# Patient Record
Sex: FEMALE | Race: WHITE | NOT HISPANIC OR LATINO | URBAN - METROPOLITAN AREA
[De-identification: names, ages, dates, MRNs, and addresses within clinical notes are randomized per-mention and may not be internally consistent; named-entity substitution may affect disease eponyms.]

---

## 2017-12-10 ENCOUNTER — EMERGENCY (EMERGENCY)
Facility: HOSPITAL | Age: 27
LOS: 0 days | Discharge: HOME | End: 2017-12-10
Attending: EMERGENCY MEDICINE
Payer: COMMERCIAL

## 2017-12-10 VITALS
HEART RATE: 97 BPM | TEMPERATURE: 98 F | OXYGEN SATURATION: 99 % | SYSTOLIC BLOOD PRESSURE: 115 MMHG | DIASTOLIC BLOOD PRESSURE: 77 MMHG | RESPIRATION RATE: 18 BRPM

## 2017-12-10 VITALS
DIASTOLIC BLOOD PRESSURE: 62 MMHG | HEIGHT: 56 IN | SYSTOLIC BLOOD PRESSURE: 126 MMHG | TEMPERATURE: 99 F | WEIGHT: 80.03 LBS | RESPIRATION RATE: 18 BRPM | OXYGEN SATURATION: 99 % | HEART RATE: 99 BPM

## 2017-12-10 DIAGNOSIS — N30.90 CYSTITIS, UNSPECIFIED WITHOUT HEMATURIA: ICD-10-CM

## 2017-12-10 DIAGNOSIS — G91.9 HYDROCEPHALUS, UNSPECIFIED: ICD-10-CM

## 2017-12-10 DIAGNOSIS — Z98.2 PRESENCE OF CEREBROSPINAL FLUID DRAINAGE DEVICE: ICD-10-CM

## 2017-12-10 DIAGNOSIS — N83.202 UNSPECIFIED OVARIAN CYST, LEFT SIDE: ICD-10-CM

## 2017-12-10 DIAGNOSIS — Z87.440 PERSONAL HISTORY OF URINARY (TRACT) INFECTIONS: ICD-10-CM

## 2017-12-10 DIAGNOSIS — R10.9 UNSPECIFIED ABDOMINAL PAIN: ICD-10-CM

## 2017-12-10 LAB
ADD ON TEST-SPECIMEN IN LAB: SIGNIFICANT CHANGE UP
ALBUMIN SERPL ELPH-MCNC: 3.9 G/DL — SIGNIFICANT CHANGE UP (ref 3.3–5)
ALP SERPL-CCNC: 46 U/L — SIGNIFICANT CHANGE UP (ref 40–120)
ALT FLD-CCNC: 15 U/L — SIGNIFICANT CHANGE UP (ref 12–78)
ANION GAP SERPL CALC-SCNC: 11 MMOL/L — SIGNIFICANT CHANGE UP (ref 5–17)
APPEARANCE UR: CLEAR — SIGNIFICANT CHANGE UP
APTT BLD: 29.1 SEC — SIGNIFICANT CHANGE UP (ref 27.5–37.4)
AST SERPL-CCNC: 9 U/L — LOW (ref 15–37)
BASOPHILS # BLD AUTO: 0 K/UL — SIGNIFICANT CHANGE UP (ref 0–0.2)
BASOPHILS NFR BLD AUTO: 1 % — SIGNIFICANT CHANGE UP (ref 0–2)
BILIRUB SERPL-MCNC: 0.6 MG/DL — SIGNIFICANT CHANGE UP (ref 0.2–1.2)
BILIRUB UR-MCNC: (no result)
BUN SERPL-MCNC: 17 MG/DL — SIGNIFICANT CHANGE UP (ref 7–23)
CALCIUM SERPL-MCNC: 9.2 MG/DL — SIGNIFICANT CHANGE UP (ref 8.5–10.1)
CHLORIDE SERPL-SCNC: 107 MMOL/L — SIGNIFICANT CHANGE UP (ref 96–108)
CO2 SERPL-SCNC: 21 MMOL/L — LOW (ref 22–31)
COLOR SPEC: (no result)
CREAT SERPL-MCNC: 0.44 MG/DL — LOW (ref 0.5–1.3)
DIFF PNL FLD: NEGATIVE — SIGNIFICANT CHANGE UP
EOSINOPHIL # BLD AUTO: 0.2 K/UL — SIGNIFICANT CHANGE UP (ref 0–0.5)
EOSINOPHIL NFR BLD AUTO: 2 % — SIGNIFICANT CHANGE UP (ref 0–6)
GLUCOSE SERPL-MCNC: 76 MG/DL — SIGNIFICANT CHANGE UP (ref 70–99)
GLUCOSE UR QL: NEGATIVE MG/DL — SIGNIFICANT CHANGE UP
HCT VFR BLD CALC: 39.8 % — SIGNIFICANT CHANGE UP (ref 34.5–45)
HGB BLD-MCNC: 13.4 G/DL — SIGNIFICANT CHANGE UP (ref 11.5–15.5)
INR BLD: 1.07 RATIO — SIGNIFICANT CHANGE UP (ref 0.88–1.16)
KETONES UR-MCNC: (no result)
LEUKOCYTE ESTERASE UR-ACNC: (no result)
LYMPHOCYTES # BLD AUTO: 2.3 K/UL — SIGNIFICANT CHANGE UP (ref 1–3.3)
LYMPHOCYTES # BLD AUTO: 37 % — SIGNIFICANT CHANGE UP (ref 13–44)
MCHC RBC-ENTMCNC: 32.9 PG — SIGNIFICANT CHANGE UP (ref 27–34)
MCHC RBC-ENTMCNC: 33.7 GM/DL — SIGNIFICANT CHANGE UP (ref 32–36)
MCV RBC AUTO: 97.6 FL — SIGNIFICANT CHANGE UP (ref 80–100)
MONOCYTES # BLD AUTO: 0.6 K/UL — SIGNIFICANT CHANGE UP (ref 0–0.9)
MONOCYTES NFR BLD AUTO: 11 % — SIGNIFICANT CHANGE UP (ref 2–14)
NEUTROPHILS # BLD AUTO: 2.4 K/UL — SIGNIFICANT CHANGE UP (ref 1.8–7.4)
NEUTROPHILS NFR BLD AUTO: 46 % — SIGNIFICANT CHANGE UP (ref 43–77)
NITRITE UR-MCNC: POSITIVE
PH UR: 6.5 — SIGNIFICANT CHANGE UP (ref 5–8)
PLATELET # BLD AUTO: 199 K/UL — SIGNIFICANT CHANGE UP (ref 150–400)
POTASSIUM SERPL-MCNC: 3.8 MMOL/L — SIGNIFICANT CHANGE UP (ref 3.5–5.3)
POTASSIUM SERPL-SCNC: 3.8 MMOL/L — SIGNIFICANT CHANGE UP (ref 3.5–5.3)
PROT SERPL-MCNC: 7.5 GM/DL — SIGNIFICANT CHANGE UP (ref 6–8.3)
PROT UR-MCNC: 15 MG/DL
PROTHROM AB SERPL-ACNC: 11.6 SEC — SIGNIFICANT CHANGE UP (ref 9.8–12.7)
RBC # BLD: 4.07 M/UL — SIGNIFICANT CHANGE UP (ref 3.8–5.2)
RBC # FLD: 10.7 % — SIGNIFICANT CHANGE UP (ref 10.3–14.5)
SODIUM SERPL-SCNC: 139 MMOL/L — SIGNIFICANT CHANGE UP (ref 135–145)
SP GR SPEC: 1.02 — SIGNIFICANT CHANGE UP (ref 1.01–1.02)
UROBILINOGEN FLD QL: 12 MG/DL
WBC # BLD: 5.5 K/UL — SIGNIFICANT CHANGE UP (ref 3.8–10.5)
WBC # FLD AUTO: 5.5 K/UL — SIGNIFICANT CHANGE UP (ref 3.8–10.5)

## 2017-12-10 PROCEDURE — 76830 TRANSVAGINAL US NON-OB: CPT | Mod: 26

## 2017-12-10 PROCEDURE — 99285 EMERGENCY DEPT VISIT HI MDM: CPT | Mod: 25

## 2017-12-10 RX ORDER — KETOROLAC TROMETHAMINE 30 MG/ML
15 SYRINGE (ML) INJECTION ONCE
Qty: 0 | Refills: 0 | Status: DISCONTINUED | OUTPATIENT
Start: 2017-12-10 | End: 2017-12-10

## 2017-12-10 RX ORDER — SODIUM CHLORIDE 9 MG/ML
1000 INJECTION INTRAMUSCULAR; INTRAVENOUS; SUBCUTANEOUS ONCE
Qty: 0 | Refills: 0 | Status: COMPLETED | OUTPATIENT
Start: 2017-12-10 | End: 2017-12-10

## 2017-12-10 RX ORDER — OXYCODONE HYDROCHLORIDE 5 MG/1
1 TABLET ORAL
Qty: 9 | Refills: 0 | OUTPATIENT
Start: 2017-12-10 | End: 2017-12-12

## 2017-12-10 RX ORDER — CEFTRIAXONE 500 MG/1
1 INJECTION, POWDER, FOR SOLUTION INTRAMUSCULAR; INTRAVENOUS ONCE
Qty: 0 | Refills: 0 | Status: DISCONTINUED | OUTPATIENT
Start: 2017-12-10 | End: 2017-12-10

## 2017-12-10 RX ORDER — OXYCODONE HYDROCHLORIDE 5 MG/1
5 TABLET ORAL ONCE
Qty: 0 | Refills: 0 | Status: DISCONTINUED | OUTPATIENT
Start: 2017-12-10 | End: 2017-12-10

## 2017-12-10 RX ORDER — CEPHALEXIN 500 MG
1 CAPSULE ORAL
Qty: 14 | Refills: 0 | OUTPATIENT
Start: 2017-12-10 | End: 2017-12-16

## 2017-12-10 RX ORDER — CEFTRIAXONE 500 MG/1
1000 INJECTION, POWDER, FOR SOLUTION INTRAMUSCULAR; INTRAVENOUS ONCE
Qty: 0 | Refills: 0 | Status: COMPLETED | OUTPATIENT
Start: 2017-12-10 | End: 2017-12-10

## 2017-12-10 RX ADMIN — Medication 15 MILLIGRAM(S): at 11:30

## 2017-12-10 RX ADMIN — Medication 15 MILLIGRAM(S): at 11:09

## 2017-12-10 RX ADMIN — CEFTRIAXONE 1000 MILLIGRAM(S): 500 INJECTION, POWDER, FOR SOLUTION INTRAMUSCULAR; INTRAVENOUS at 08:59

## 2017-12-10 RX ADMIN — SODIUM CHLORIDE 666.67 MILLILITER(S): 9 INJECTION INTRAMUSCULAR; INTRAVENOUS; SUBCUTANEOUS at 07:59

## 2017-12-10 RX ADMIN — SODIUM CHLORIDE 1000 MILLILITER(S): 9 INJECTION INTRAMUSCULAR; INTRAVENOUS; SUBCUTANEOUS at 08:59

## 2017-12-10 RX ADMIN — OXYCODONE HYDROCHLORIDE 5 MILLIGRAM(S): 5 TABLET ORAL at 12:31

## 2017-12-10 NOTE — ED PROVIDER NOTE - SKIN, MLM
Skin normal color for race, warm, dry and intact. No evidence of rash. Well healed lower lumbar/sacral scar.

## 2017-12-10 NOTE — ED PROVIDER NOTE - PROGRESS NOTE DETAILS
pelvic exam performed, chaperone annemarie eddy RN. normal external genitalia. bialt adnexal ttp R>L, no cmt. no discharge, no bleeding. - Jackson Rodrgiuez MD pt seen and cleared by gyn, who recommends analgesia, obgyn f/u. pt feels improved s/p oxycodone. discussed proper follow-up, indications to return to ED. stable for d/c. - Jackson Rodriguez MD

## 2017-12-10 NOTE — ED ADULT NURSE REASSESSMENT NOTE - NS ED NURSE REASSESS COMMENT FT1
received pt from [facility]. pt awaiting sono results. family at bedside. Will continue to monitor pt.

## 2017-12-10 NOTE — CONSULT NOTE ADULT - ASSESSMENT
A/P Pt with left 3-4 cm hemorrhagic cyst.  Pt with no clinical evidence of torsion.  Sono with flow to both ovaries.  Pt with possible UTI.    REC:    1) Abx for UTI  2) Analgesics for ov cyst discomfort  3) Pt to F/U with GYN in [place]  4) Pt to come back if any increased pain, bleeding, N/V  5) Pt cleared by GYN

## 2017-12-10 NOTE — ED PROVIDER NOTE - CARE PLAN
Principal Discharge DX:	Cyst of left ovary  Instructions for follow-up, activity and diet:	1. Take Tylenol as directed for pain.   2. Take oxycodone as directed for severe pain. This medication will make you drowsy, do not drive while taking medication.   3. Take Keflex as directed for urinary infection  4. Follow-up with your OBGYN or Dr. Campuzano in 2-3 days  5. Return immediately for any worsening or concerning symptoms  Secondary Diagnosis:	Cystitis

## 2017-12-10 NOTE — ED ADULT TRIAGE NOTE - CHIEF COMPLAINT QUOTE
LLQ x5 days worsening last night. Denies fever,chills, vomiting, nausea. (+) burning urination. Took aleve with no relief.

## 2017-12-10 NOTE — CONSULT NOTE ADULT - SUBJECTIVE AND OBJECTIVE BOX
26 y/o  LMP 11/23/17 with c/o of 5 days hx of ab pain not responsive to Alleve and Tylenol at home.  +Mild nausea        PMH - Spina Bifida  PSH -  shunt  POB - Not sig  Meds - None  SH - neg x 3    PE - VSS, afebrile    Ab - soft/nt/nd/no guarding/no rebound  Pelvic - Deferred  Back - No CVAT      Ultrasound of the female pelvis:    CLINICAL INFORMATION:   LEFT-sided pelvic  Pain.  shunt.    TECHNIQUE:  Transabdominal and endovaginal ultrasonography was performed.    FINDINGS:  CT scan dated 5/9/2016 is available for review.    The uterus is anteverted.  It measures 5.6 cm in length x 3.6 cm AP x   RIGHT 0.3 cm transverse.  Its contours are smooth.  The myometrium   demonstrates no focal lesion.  The endometrium demonstrates a thickness   of 1.2 cm.         The right ovary measures 2.8 x 1.7 x 1.2 cm.       The left ovary   measures 4.8 x 4.4 x 3.5 cm. there is a 3.6 x 3.8 x 3.5 cm complex   hemorrhagic cyst. Normal flow is noted bilaterally.      No adnexal   suspicious mass, calcification or fat is found.         Mild free fluid and the  shunt catheter is found in the pelvis.    The bladder appears unremarkable.      IMPRESSION:    3.6 x 3.8 x 3.5 cm complex hemorrhagic LEFT ovarian cyst.     Mild free fluid and the  shunt catheter is found in the pelvis.    CBC - WNL  UCG - neg  U/A - WBC 3-5; Many bacteria

## 2017-12-10 NOTE — ED PROVIDER NOTE - MEDICAL DECISION MAKING DETAILS
27F with hx spina bifida, self-catheterizes, also with hx chronic uti and ovarian cysts, presents with LLQ/suprapubic pain, cloudy urine. 27F with hx spina bifida, self-catheterizes, also with hx chronic uti and ovarian cysts, presents with LLQ/suprapubic pain, cloudy urine. +Bilat adnexal ttp. WIth minimal suprapubic ttp, no other abd ttp, very low suspicion of GI pathology including appendicitis, diverticulitis. Concern for potential cystitis, check UA, culture. CHeck labs including cbc, cmp. TVUS to assess for ovarian cyst or other gyn pathology. Pt not sexually active, no vag d/c, extremely low suspicion STI. Will treat for cystitis as necessary, give analgesia, and re-assess pt. - Jackson Rodriguez MD

## 2017-12-10 NOTE — ED PROVIDER NOTE - PLAN OF CARE
1. Take Tylenol as directed for pain.   2. Take oxycodone as directed for severe pain. This medication will make you drowsy, do not drive while taking medication.   3. Take Keflex as directed for urinary infection  4. Follow-up with your OBGYN or Dr. Campuzano in 2-3 days  5. Return immediately for any worsening or concerning symptoms

## 2017-12-10 NOTE — ED ADULT NURSE NOTE - OBJECTIVE STATEMENT
Pt presents to the ED with complaints of LLQ pain x 5 days which worsened last night. Pt denies vomiting but has had intermittent nausea, able to tolerate PO food and fluids but has decreased appetite. Pt states she has not had a BM in 4 days.

## 2017-12-10 NOTE — ED PROVIDER NOTE - OBJECTIVE STATEMENT
27F with pmh of spina bifida, s/p  shunt, ovarian cysts, chronic UTIs, self-catheterizes 4x a day, s/p R pelvic pain ~1 mo ago which she attributed to ruptured R ovarian cyst, presents with LLQ pain, x 5 days. Also with suprapubic pain. Pain does not radiate. Pain alleviated with heating pad. pain severe. "fluctuates from sharp to dull." no f/c. nausea, no vomiting. no diarrhea, has been having constipation, last BM ~4 days ago. +foul smell to urine, cloudy urine. did take Azo. 27F with pmh of spina bifida, s/p  shunt, ovarian cysts, chronic UTIs, self-catheterizes 4x a day, s/p R pelvic pain ~1 mo ago which she attributed to ruptured R ovarian cyst, presents with LLQ pain, x 5 days. Also with suprapubic pain. Pain does not radiate. Pain alleviated with heating pad. pain severe. "fluctuates from sharp to dull." no f/c. nausea, no vomiting. no diarrhea, has been having constipation, last BM ~4 days ago. +foul smell to urine, cloudy urine. did take Azo.    LMP ~11/23  PMD Rosalba thompson 27F with pmh of spina bifida, s/p  shunt, ovarian cysts, chronic UTIs, self-catheterizes 4x a day, s/p R pelvic pain ~1 mo ago which she attributed to ruptured R ovarian cyst, presents with LLQ pain, x 5 days. Also with suprapubic pain. Pain does not radiate. Pain alleviated with heating pad. pain severe. "fluctuates from sharp to dull." no f/c. nausea, no vomiting. no diarrhea, has been having constipation, last BM ~4 days ago. +foul smell to urine, cloudy urine. did take Azo. pt uses crutches to ambulate at baseline, no change in strength.    LMP ~11/23  PMD Rosalba thompson

## 2017-12-29 ENCOUNTER — EMERGENCY (EMERGENCY)
Facility: HOSPITAL | Age: 27
LOS: 0 days | Discharge: HOME | End: 2017-12-29
Attending: EMERGENCY MEDICINE | Admitting: EMERGENCY MEDICINE
Payer: COMMERCIAL

## 2017-12-29 VITALS
TEMPERATURE: 98 F | HEIGHT: 56 IN | DIASTOLIC BLOOD PRESSURE: 67 MMHG | HEART RATE: 94 BPM | RESPIRATION RATE: 18 BRPM | OXYGEN SATURATION: 98 % | SYSTOLIC BLOOD PRESSURE: 98 MMHG | WEIGHT: 82.89 LBS

## 2017-12-29 VITALS
HEART RATE: 88 BPM | SYSTOLIC BLOOD PRESSURE: 107 MMHG | OXYGEN SATURATION: 100 % | DIASTOLIC BLOOD PRESSURE: 70 MMHG | RESPIRATION RATE: 18 BRPM | TEMPERATURE: 98 F

## 2017-12-29 DIAGNOSIS — R10.32 LEFT LOWER QUADRANT PAIN: ICD-10-CM

## 2017-12-29 DIAGNOSIS — Q05.9 SPINA BIFIDA, UNSPECIFIED: ICD-10-CM

## 2017-12-29 DIAGNOSIS — N39.0 URINARY TRACT INFECTION, SITE NOT SPECIFIED: ICD-10-CM

## 2017-12-29 LAB
ALBUMIN SERPL ELPH-MCNC: 4.4 G/DL — SIGNIFICANT CHANGE UP (ref 3.3–5)
ALP SERPL-CCNC: 44 U/L — SIGNIFICANT CHANGE UP (ref 40–120)
ALT FLD-CCNC: 15 U/L — SIGNIFICANT CHANGE UP (ref 12–78)
ANION GAP SERPL CALC-SCNC: 10 MMOL/L — SIGNIFICANT CHANGE UP (ref 5–17)
APPEARANCE UR: (no result)
AST SERPL-CCNC: 8 U/L — LOW (ref 15–37)
BACTERIA # UR AUTO: (no result)
BASOPHILS # BLD AUTO: 0 K/UL — SIGNIFICANT CHANGE UP (ref 0–0.2)
BASOPHILS NFR BLD AUTO: 0.9 % — SIGNIFICANT CHANGE UP (ref 0–2)
BILIRUB SERPL-MCNC: 0.6 MG/DL — SIGNIFICANT CHANGE UP (ref 0.2–1.2)
BILIRUB UR-MCNC: NEGATIVE — SIGNIFICANT CHANGE UP
BUN SERPL-MCNC: 26 MG/DL — HIGH (ref 7–23)
CALCIUM SERPL-MCNC: 9.3 MG/DL — SIGNIFICANT CHANGE UP (ref 8.5–10.1)
CHLORIDE SERPL-SCNC: 111 MMOL/L — HIGH (ref 96–108)
CO2 SERPL-SCNC: 23 MMOL/L — SIGNIFICANT CHANGE UP (ref 22–31)
COLOR SPEC: YELLOW — SIGNIFICANT CHANGE UP
COMMENT - URINE: SIGNIFICANT CHANGE UP
CREAT SERPL-MCNC: 0.62 MG/DL — SIGNIFICANT CHANGE UP (ref 0.5–1.3)
DIFF PNL FLD: (no result)
EOSINOPHIL # BLD AUTO: 0.1 K/UL — SIGNIFICANT CHANGE UP (ref 0–0.5)
EOSINOPHIL NFR BLD AUTO: 3.4 % — SIGNIFICANT CHANGE UP (ref 0–6)
EPI CELLS # UR: SIGNIFICANT CHANGE UP
GLUCOSE SERPL-MCNC: 129 MG/DL — HIGH (ref 70–99)
GLUCOSE UR QL: NEGATIVE MG/DL — SIGNIFICANT CHANGE UP
HCG SERPL-ACNC: <1 MIU/ML — SIGNIFICANT CHANGE UP
HCT VFR BLD CALC: 39.9 % — SIGNIFICANT CHANGE UP (ref 34.5–45)
HGB BLD-MCNC: 13.1 G/DL — SIGNIFICANT CHANGE UP (ref 11.5–15.5)
KETONES UR-MCNC: (no result)
LEUKOCYTE ESTERASE UR-ACNC: (no result)
LYMPHOCYTES # BLD AUTO: 1.3 K/UL — SIGNIFICANT CHANGE UP (ref 1–3.3)
LYMPHOCYTES # BLD AUTO: 32.3 % — SIGNIFICANT CHANGE UP (ref 13–44)
MCHC RBC-ENTMCNC: 32.1 PG — SIGNIFICANT CHANGE UP (ref 27–34)
MCHC RBC-ENTMCNC: 33 GM/DL — SIGNIFICANT CHANGE UP (ref 32–36)
MCV RBC AUTO: 97.4 FL — SIGNIFICANT CHANGE UP (ref 80–100)
MONOCYTES # BLD AUTO: 0.4 K/UL — SIGNIFICANT CHANGE UP (ref 0–0.9)
MONOCYTES NFR BLD AUTO: 9.9 % — SIGNIFICANT CHANGE UP (ref 2–14)
NEUTROPHILS # BLD AUTO: 2.2 K/UL — SIGNIFICANT CHANGE UP (ref 1.8–7.4)
NEUTROPHILS NFR BLD AUTO: 53.5 % — SIGNIFICANT CHANGE UP (ref 43–77)
NITRITE UR-MCNC: POSITIVE
PH UR: 6 — SIGNIFICANT CHANGE UP (ref 5–8)
PLATELET # BLD AUTO: 258 K/UL — SIGNIFICANT CHANGE UP (ref 150–400)
POTASSIUM SERPL-MCNC: 3.2 MMOL/L — LOW (ref 3.5–5.3)
POTASSIUM SERPL-SCNC: 3.2 MMOL/L — LOW (ref 3.5–5.3)
PROT SERPL-MCNC: 7.8 GM/DL — SIGNIFICANT CHANGE UP (ref 6–8.3)
PROT UR-MCNC: 30 MG/DL
RBC # BLD: 4.09 M/UL — SIGNIFICANT CHANGE UP (ref 3.8–5.2)
RBC # FLD: 11 % — SIGNIFICANT CHANGE UP (ref 10.3–14.5)
RBC CASTS # UR COMP ASSIST: SIGNIFICANT CHANGE UP /HPF (ref 0–4)
SODIUM SERPL-SCNC: 144 MMOL/L — SIGNIFICANT CHANGE UP (ref 135–145)
SP GR SPEC: 1.02 — SIGNIFICANT CHANGE UP (ref 1.01–1.02)
UROBILINOGEN FLD QL: NEGATIVE MG/DL — SIGNIFICANT CHANGE UP
WBC # BLD: 4.1 K/UL — SIGNIFICANT CHANGE UP (ref 3.8–10.5)
WBC # FLD AUTO: 4.1 K/UL — SIGNIFICANT CHANGE UP (ref 3.8–10.5)
WBC UR QL: (no result)

## 2017-12-29 PROCEDURE — 99284 EMERGENCY DEPT VISIT MOD MDM: CPT

## 2017-12-29 PROCEDURE — 76830 TRANSVAGINAL US NON-OB: CPT | Mod: 26

## 2017-12-29 PROCEDURE — 74177 CT ABD & PELVIS W/CONTRAST: CPT | Mod: 26

## 2017-12-29 RX ORDER — MORPHINE SULFATE 50 MG/1
4 CAPSULE, EXTENDED RELEASE ORAL ONCE
Qty: 0 | Refills: 0 | Status: DISCONTINUED | OUTPATIENT
Start: 2017-12-29 | End: 2017-12-29

## 2017-12-29 RX ORDER — CEPHALEXIN 500 MG
1 CAPSULE ORAL
Qty: 14 | Refills: 0 | OUTPATIENT
Start: 2017-12-29 | End: 2018-01-04

## 2017-12-29 RX ORDER — OXYCODONE HYDROCHLORIDE 5 MG/1
5 TABLET ORAL ONCE
Qty: 0 | Refills: 0 | Status: DISCONTINUED | OUTPATIENT
Start: 2017-12-29 | End: 2017-12-29

## 2017-12-29 RX ORDER — KETOROLAC TROMETHAMINE 30 MG/ML
30 SYRINGE (ML) INJECTION ONCE
Qty: 0 | Refills: 0 | Status: DISCONTINUED | OUTPATIENT
Start: 2017-12-29 | End: 2017-12-29

## 2017-12-29 RX ORDER — SODIUM CHLORIDE 9 MG/ML
2000 INJECTION INTRAMUSCULAR; INTRAVENOUS; SUBCUTANEOUS ONCE
Qty: 0 | Refills: 0 | Status: COMPLETED | OUTPATIENT
Start: 2017-12-29 | End: 2017-12-29

## 2017-12-29 RX ORDER — POTASSIUM CHLORIDE 20 MEQ
40 PACKET (EA) ORAL ONCE
Qty: 0 | Refills: 0 | Status: COMPLETED | OUTPATIENT
Start: 2017-12-29 | End: 2017-12-29

## 2017-12-29 RX ORDER — DIPHENHYDRAMINE HCL 50 MG
25 CAPSULE ORAL ONCE
Qty: 0 | Refills: 0 | Status: COMPLETED | OUTPATIENT
Start: 2017-12-29 | End: 2017-12-29

## 2017-12-29 RX ORDER — METOCLOPRAMIDE HCL 10 MG
10 TABLET ORAL ONCE
Qty: 0 | Refills: 0 | Status: COMPLETED | OUTPATIENT
Start: 2017-12-29 | End: 2017-12-29

## 2017-12-29 RX ORDER — ONDANSETRON 8 MG/1
4 TABLET, FILM COATED ORAL ONCE
Qty: 0 | Refills: 0 | Status: COMPLETED | OUTPATIENT
Start: 2017-12-29 | End: 2017-12-29

## 2017-12-29 RX ADMIN — Medication 30 MILLIGRAM(S): at 04:13

## 2017-12-29 RX ADMIN — MORPHINE SULFATE 4 MILLIGRAM(S): 50 CAPSULE, EXTENDED RELEASE ORAL at 11:16

## 2017-12-29 RX ADMIN — Medication 10 MILLIGRAM(S): at 07:44

## 2017-12-29 RX ADMIN — Medication 30 MILLIGRAM(S): at 14:34

## 2017-12-29 RX ADMIN — OXYCODONE HYDROCHLORIDE 5 MILLIGRAM(S): 5 TABLET ORAL at 07:15

## 2017-12-29 RX ADMIN — MORPHINE SULFATE 4 MILLIGRAM(S): 50 CAPSULE, EXTENDED RELEASE ORAL at 04:13

## 2017-12-29 RX ADMIN — ONDANSETRON 4 MILLIGRAM(S): 8 TABLET, FILM COATED ORAL at 04:13

## 2017-12-29 RX ADMIN — Medication 40 MILLIEQUIVALENT(S): at 06:45

## 2017-12-29 RX ADMIN — MORPHINE SULFATE 4 MILLIGRAM(S): 50 CAPSULE, EXTENDED RELEASE ORAL at 10:46

## 2017-12-29 RX ADMIN — Medication 25 MILLIGRAM(S): at 07:44

## 2017-12-29 RX ADMIN — OXYCODONE HYDROCHLORIDE 5 MILLIGRAM(S): 5 TABLET ORAL at 06:45

## 2017-12-29 RX ADMIN — SODIUM CHLORIDE 2000 MILLILITER(S): 9 INJECTION INTRAMUSCULAR; INTRAVENOUS; SUBCUTANEOUS at 04:00

## 2017-12-29 NOTE — ED PROVIDER NOTE - OBJECTIVE STATEMENT
28 yo female with h/o spina bifida and  shunt c/o left sided abdominal pain.  Pt states left sided abdominal pain, seen Dec 10 in ED, diagnosed with hemorrhagic left ovarian cyst 28 yo female with h/o spina bifida and  shunt c/o left sided abdominal pain.  Pt states left sided abdominal pain, seen Dec 10 in ED, diagnosed with hemorrhagic left ovarian cyst.  Was given Rx oxycodone and was feeling better.  Followed up with her gyn and was given progesterone.  Symptoms resolved, she had a menstrual period last week.  Today the pain returned.  Had pain all day Thursday into the night.  No relief with aleve or tylenol.  Pain 10/10.  Feeling nauseated and vomited at home. 28 yo female with h/o spina bifida and  shunt c/o left sided abdominal pain.  Pt states left sided abdominal pain, seen Dec 10 in ED, diagnosed with hemorrhagic left ovarian cyst.  Was given Rx oxycodone and was feeling better.  Followed up with her gyn and was given progesterone.  Symptoms resolved, she had a menstrual period last week.  Today the pain returned.  Had pain all day Thursday into the night.  No relief with aleve or tylenol.  Pain 10/10.  Feeling nauseated and vomited at home.  Pt being worked up by Gyn for probable endometriosis

## 2017-12-29 NOTE — ED ADULT NURSE NOTE - OBJECTIVE STATEMENT
Pt presents to the ED with complaints of lower abdominal pain that began the morning of 12/28/17. Pt has a history of ovarian cysts and states she finished her period yesterday. Pt states the pain has been so severe it has caused nausea and vomiting. Pt states she has thrown up approx 4 times including once in the ED. Pt also states she has a h/o UTIs and does self cath due to medical history.

## 2017-12-29 NOTE — ED PROVIDER NOTE - MEDICAL DECISION MAKING DETAILS
26 yo female with abdominal pain she thinks is related to an ovarian cyst, will check labs, US and medicate

## 2017-12-29 NOTE — ED ADULT NURSE REASSESSMENT NOTE - NS ED NURSE REASSESS COMMENT FT1
Pt and family updated as to plan of care at this time. Pt taken for US and aware as to reason for US and has no questions. Awaiting pt return at this time. Will continue to monitor.

## 2017-12-29 NOTE — ED PROVIDER NOTE - PROGRESS NOTE DETAILS
US unremarkable. Pt with persistent severe left sided pain.  Will CT oral and IV contrast, although pt may just be suffering from endometriosis.

## 2017-12-31 LAB
-  AMIKACIN: SIGNIFICANT CHANGE UP
-  AMPICILLIN/SULBACTAM: SIGNIFICANT CHANGE UP
-  AMPICILLIN: SIGNIFICANT CHANGE UP
-  AZTREONAM: SIGNIFICANT CHANGE UP
-  CEFAZOLIN: SIGNIFICANT CHANGE UP
-  CEFEPIME: SIGNIFICANT CHANGE UP
-  CEFOXITIN: SIGNIFICANT CHANGE UP
-  CEFTAZIDIME: SIGNIFICANT CHANGE UP
-  CEFTRIAXONE: SIGNIFICANT CHANGE UP
-  CIPROFLOXACIN: SIGNIFICANT CHANGE UP
-  ERTAPENEM: SIGNIFICANT CHANGE UP
-  GENTAMICIN: SIGNIFICANT CHANGE UP
-  IMIPENEM: SIGNIFICANT CHANGE UP
-  LEVOFLOXACIN: SIGNIFICANT CHANGE UP
-  MEROPENEM: SIGNIFICANT CHANGE UP
-  NITROFURANTOIN: SIGNIFICANT CHANGE UP
-  PIPERACILLIN/TAZOBACTAM: SIGNIFICANT CHANGE UP
-  TOBRAMYCIN: SIGNIFICANT CHANGE UP
-  TRIMETHOPRIM/SULFAMETHOXAZOLE: SIGNIFICANT CHANGE UP
METHOD TYPE: SIGNIFICANT CHANGE UP

## 2018-01-01 LAB
-  AMIKACIN: SIGNIFICANT CHANGE UP
-  AMPICILLIN/SULBACTAM: SIGNIFICANT CHANGE UP
-  AMPICILLIN: SIGNIFICANT CHANGE UP
-  AZTREONAM: SIGNIFICANT CHANGE UP
-  CEFAZOLIN: SIGNIFICANT CHANGE UP
-  CEFEPIME: SIGNIFICANT CHANGE UP
-  CEFOXITIN: SIGNIFICANT CHANGE UP
-  CEFTAZIDIME: SIGNIFICANT CHANGE UP
-  CEFTRIAXONE: SIGNIFICANT CHANGE UP
-  CIPROFLOXACIN: SIGNIFICANT CHANGE UP
-  ERTAPENEM: SIGNIFICANT CHANGE UP
-  GENTAMICIN: SIGNIFICANT CHANGE UP
-  IMIPENEM: SIGNIFICANT CHANGE UP
-  LEVOFLOXACIN: SIGNIFICANT CHANGE UP
-  MEROPENEM: SIGNIFICANT CHANGE UP
-  NITROFURANTOIN: SIGNIFICANT CHANGE UP
-  PIPERACILLIN/TAZOBACTAM: SIGNIFICANT CHANGE UP
-  TOBRAMYCIN: SIGNIFICANT CHANGE UP
-  TRIMETHOPRIM/SULFAMETHOXAZOLE: SIGNIFICANT CHANGE UP
CULTURE RESULTS: SIGNIFICANT CHANGE UP
METHOD TYPE: SIGNIFICANT CHANGE UP
ORGANISM # SPEC MICROSCOPIC CNT: SIGNIFICANT CHANGE UP
SPECIMEN SOURCE: SIGNIFICANT CHANGE UP

## 2018-08-13 NOTE — ED ADULT NURSE NOTE - HIV INFORMATION PROVIDED
Returned call to pt.  Pt states affected area on leg has become more firm to touch, more painful and warmer.  Color is changing from red to more of a purple color.  Pt also states discoloration is spreading down the leg further toward the knee.  Pt denies alterations of sensation.    Advised pt that he should have area re-evaluated in ER d/t worsening.  Pt verbalizes understanding.   Yes

## 2019-02-14 ENCOUNTER — EMERGENCY (EMERGENCY)
Facility: HOSPITAL | Age: 29
LOS: 0 days | Discharge: ANOTHER HOSPITAL | End: 2019-02-14
Attending: EMERGENCY MEDICINE | Admitting: EMERGENCY MEDICINE
Payer: COMMERCIAL

## 2019-02-14 ENCOUNTER — INPATIENT (INPATIENT)
Facility: HOSPITAL | Age: 29
LOS: 2 days | Discharge: HOME | End: 2019-02-17
Attending: SPECIALIST | Admitting: SPECIALIST
Payer: COMMERCIAL

## 2019-02-14 VITALS
TEMPERATURE: 98 F | DIASTOLIC BLOOD PRESSURE: 79 MMHG | RESPIRATION RATE: 18 BRPM | HEART RATE: 97 BPM | SYSTOLIC BLOOD PRESSURE: 110 MMHG | OXYGEN SATURATION: 98 %

## 2019-02-14 VITALS
RESPIRATION RATE: 17 BRPM | HEART RATE: 107 BPM | SYSTOLIC BLOOD PRESSURE: 113 MMHG | TEMPERATURE: 99 F | DIASTOLIC BLOOD PRESSURE: 71 MMHG | OXYGEN SATURATION: 97 %

## 2019-02-14 VITALS
TEMPERATURE: 99 F | OXYGEN SATURATION: 98 % | HEIGHT: 56 IN | RESPIRATION RATE: 18 BRPM | HEART RATE: 121 BPM | DIASTOLIC BLOOD PRESSURE: 98 MMHG | SYSTOLIC BLOOD PRESSURE: 143 MMHG | WEIGHT: 82.89 LBS

## 2019-02-14 DIAGNOSIS — R11.0 NAUSEA: ICD-10-CM

## 2019-02-14 DIAGNOSIS — Q05.9 SPINA BIFIDA, UNSPECIFIED: ICD-10-CM

## 2019-02-14 DIAGNOSIS — K56.609 UNSPECIFIED INTESTINAL OBSTRUCTION, UNSPECIFIED AS TO PARTIAL VERSUS COMPLETE OBSTRUCTION: ICD-10-CM

## 2019-02-14 DIAGNOSIS — G91.9 HYDROCEPHALUS, UNSPECIFIED: ICD-10-CM

## 2019-02-14 DIAGNOSIS — Z79.899 OTHER LONG TERM (CURRENT) DRUG THERAPY: ICD-10-CM

## 2019-02-14 DIAGNOSIS — R10.9 UNSPECIFIED ABDOMINAL PAIN: ICD-10-CM

## 2019-02-14 DIAGNOSIS — K59.00 CONSTIPATION, UNSPECIFIED: ICD-10-CM

## 2019-02-14 LAB
ALBUMIN SERPL ELPH-MCNC: 4.4 G/DL — SIGNIFICANT CHANGE UP (ref 3.3–5)
ALP SERPL-CCNC: 58 U/L — SIGNIFICANT CHANGE UP (ref 40–120)
ALT FLD-CCNC: 39 U/L — SIGNIFICANT CHANGE UP (ref 12–78)
ANION GAP SERPL CALC-SCNC: 11 MMOL/L — SIGNIFICANT CHANGE UP (ref 5–17)
APPEARANCE UR: CLEAR — SIGNIFICANT CHANGE UP
APTT BLD: 30.1 SEC — SIGNIFICANT CHANGE UP (ref 27.5–36.3)
AST SERPL-CCNC: 26 U/L — SIGNIFICANT CHANGE UP (ref 15–37)
BASOPHILS # BLD AUTO: 0.05 K/UL — SIGNIFICANT CHANGE UP (ref 0–0.2)
BASOPHILS NFR BLD AUTO: 0.5 % — SIGNIFICANT CHANGE UP (ref 0–2)
BILIRUB SERPL-MCNC: 0.4 MG/DL — SIGNIFICANT CHANGE UP (ref 0.2–1.2)
BILIRUB UR-MCNC: NEGATIVE — SIGNIFICANT CHANGE UP
BLD GP AB SCN SERPL QL: SIGNIFICANT CHANGE UP
BUN SERPL-MCNC: 17 MG/DL — SIGNIFICANT CHANGE UP (ref 7–23)
CALCIUM SERPL-MCNC: 9.3 MG/DL — SIGNIFICANT CHANGE UP (ref 8.5–10.1)
CHLORIDE SERPL-SCNC: 106 MMOL/L — SIGNIFICANT CHANGE UP (ref 96–108)
CO2 SERPL-SCNC: 22 MMOL/L — SIGNIFICANT CHANGE UP (ref 22–31)
COLOR SPEC: YELLOW — SIGNIFICANT CHANGE UP
CREAT SERPL-MCNC: 0.58 MG/DL — SIGNIFICANT CHANGE UP (ref 0.5–1.3)
DIFF PNL FLD: NEGATIVE — SIGNIFICANT CHANGE UP
EOSINOPHIL # BLD AUTO: 0.07 K/UL — SIGNIFICANT CHANGE UP (ref 0–0.5)
EOSINOPHIL NFR BLD AUTO: 0.7 % — SIGNIFICANT CHANGE UP (ref 0–6)
GLUCOSE SERPL-MCNC: 116 MG/DL — HIGH (ref 70–99)
GLUCOSE UR QL: NEGATIVE MG/DL — SIGNIFICANT CHANGE UP
HCG SERPL-ACNC: <1 MIU/ML — SIGNIFICANT CHANGE UP
HCT VFR BLD CALC: 41.7 % — SIGNIFICANT CHANGE UP (ref 34.5–45)
HGB BLD-MCNC: 14 G/DL — SIGNIFICANT CHANGE UP (ref 11.5–15.5)
IMM GRANULOCYTES NFR BLD AUTO: 0.3 % — SIGNIFICANT CHANGE UP (ref 0–1.5)
INR BLD: 1 RATIO — SIGNIFICANT CHANGE UP (ref 0.88–1.16)
KETONES UR-MCNC: ABNORMAL
LACTATE SERPL-SCNC: 0.6 MMOL/L — SIGNIFICANT CHANGE UP (ref 0.5–2)
LEUKOCYTE ESTERASE UR-ACNC: NEGATIVE — SIGNIFICANT CHANGE UP
LIDOCAIN IGE QN: 296 U/L — SIGNIFICANT CHANGE UP (ref 73–393)
LYMPHOCYTES # BLD AUTO: 1.59 K/UL — SIGNIFICANT CHANGE UP (ref 1–3.3)
LYMPHOCYTES # BLD AUTO: 15.8 % — SIGNIFICANT CHANGE UP (ref 13–44)
MCHC RBC-ENTMCNC: 32 PG — SIGNIFICANT CHANGE UP (ref 27–34)
MCHC RBC-ENTMCNC: 33.6 GM/DL — SIGNIFICANT CHANGE UP (ref 32–36)
MCV RBC AUTO: 95.4 FL — SIGNIFICANT CHANGE UP (ref 80–100)
MONOCYTES # BLD AUTO: 0.66 K/UL — SIGNIFICANT CHANGE UP (ref 0–0.9)
MONOCYTES NFR BLD AUTO: 6.6 % — SIGNIFICANT CHANGE UP (ref 2–14)
NEUTROPHILS # BLD AUTO: 7.67 K/UL — HIGH (ref 1.8–7.4)
NEUTROPHILS NFR BLD AUTO: 76.1 % — SIGNIFICANT CHANGE UP (ref 43–77)
NITRITE UR-MCNC: NEGATIVE — SIGNIFICANT CHANGE UP
NRBC # BLD: 0 /100 WBCS — SIGNIFICANT CHANGE UP (ref 0–0)
PH UR: 9 — HIGH (ref 5–8)
PLATELET # BLD AUTO: 318 K/UL — SIGNIFICANT CHANGE UP (ref 150–400)
POTASSIUM SERPL-MCNC: 3.8 MMOL/L — SIGNIFICANT CHANGE UP (ref 3.5–5.3)
POTASSIUM SERPL-SCNC: 3.8 MMOL/L — SIGNIFICANT CHANGE UP (ref 3.5–5.3)
PROT SERPL-MCNC: 8.3 GM/DL — SIGNIFICANT CHANGE UP (ref 6–8.3)
PROT UR-MCNC: NEGATIVE MG/DL — SIGNIFICANT CHANGE UP
PROTHROM AB SERPL-ACNC: 11.1 SEC — SIGNIFICANT CHANGE UP (ref 10–12.9)
RBC # BLD: 4.37 M/UL — SIGNIFICANT CHANGE UP (ref 3.8–5.2)
RBC # FLD: 12.6 % — SIGNIFICANT CHANGE UP (ref 10.3–14.5)
SODIUM SERPL-SCNC: 139 MMOL/L — SIGNIFICANT CHANGE UP (ref 135–145)
SP GR SPEC: 1.01 — SIGNIFICANT CHANGE UP (ref 1.01–1.02)
TYPE + AB SCN PNL BLD: SIGNIFICANT CHANGE UP
UROBILINOGEN FLD QL: NEGATIVE MG/DL — SIGNIFICANT CHANGE UP
WBC # BLD: 10.07 K/UL — SIGNIFICANT CHANGE UP (ref 3.8–10.5)
WBC # FLD AUTO: 10.07 K/UL — SIGNIFICANT CHANGE UP (ref 3.8–10.5)

## 2019-02-14 PROCEDURE — 99285 EMERGENCY DEPT VISIT HI MDM: CPT | Mod: 25

## 2019-02-14 PROCEDURE — 74177 CT ABD & PELVIS W/CONTRAST: CPT | Mod: 26

## 2019-02-14 PROCEDURE — 71045 X-RAY EXAM CHEST 1 VIEW: CPT | Mod: 26

## 2019-02-14 RX ORDER — SODIUM CHLORIDE 9 MG/ML
1000 INJECTION INTRAMUSCULAR; INTRAVENOUS; SUBCUTANEOUS ONCE
Qty: 0 | Refills: 0 | Status: COMPLETED | OUTPATIENT
Start: 2019-02-14 | End: 2019-02-14

## 2019-02-14 RX ORDER — DIPHENHYDRAMINE HCL 50 MG
25 CAPSULE ORAL ONCE
Qty: 0 | Refills: 0 | Status: COMPLETED | OUTPATIENT
Start: 2019-02-14 | End: 2019-02-14

## 2019-02-14 RX ORDER — PROCHLORPERAZINE MALEATE 5 MG
10 TABLET ORAL ONCE
Qty: 0 | Refills: 0 | Status: COMPLETED | OUTPATIENT
Start: 2019-02-14 | End: 2019-02-14

## 2019-02-14 RX ORDER — PROCHLORPERAZINE MALEATE 5 MG
10 TABLET ORAL ONCE
Qty: 0 | Refills: 0 | Status: DISCONTINUED | OUTPATIENT
Start: 2019-02-14 | End: 2019-02-14

## 2019-02-14 RX ORDER — MORPHINE SULFATE 50 MG/1
2 CAPSULE, EXTENDED RELEASE ORAL ONCE
Qty: 0 | Refills: 0 | Status: DISCONTINUED | OUTPATIENT
Start: 2019-02-14 | End: 2019-02-14

## 2019-02-14 RX ORDER — MORPHINE SULFATE 50 MG/1
4 CAPSULE, EXTENDED RELEASE ORAL ONCE
Qty: 0 | Refills: 0 | Status: DISCONTINUED | OUTPATIENT
Start: 2019-02-14 | End: 2019-02-14

## 2019-02-14 RX ORDER — PROCHLORPERAZINE MALEATE 5 MG
2 TABLET ORAL ONCE
Qty: 0 | Refills: 0 | Status: DISCONTINUED | OUTPATIENT
Start: 2019-02-14 | End: 2019-02-14

## 2019-02-14 RX ORDER — SODIUM CHLORIDE 9 MG/ML
3 INJECTION INTRAMUSCULAR; INTRAVENOUS; SUBCUTANEOUS ONCE
Qty: 0 | Refills: 0 | Status: COMPLETED | OUTPATIENT
Start: 2019-02-14 | End: 2019-02-14

## 2019-02-14 RX ORDER — ONDANSETRON 8 MG/1
4 TABLET, FILM COATED ORAL ONCE
Qty: 0 | Refills: 0 | Status: COMPLETED | OUTPATIENT
Start: 2019-02-14 | End: 2019-02-14

## 2019-02-14 RX ORDER — ENOXAPARIN SODIUM 100 MG/ML
40 INJECTION SUBCUTANEOUS EVERY 24 HOURS
Qty: 0 | Refills: 0 | Status: DISCONTINUED | OUTPATIENT
Start: 2019-02-14 | End: 2019-02-17

## 2019-02-14 RX ORDER — SODIUM CHLORIDE 9 MG/ML
1000 INJECTION, SOLUTION INTRAVENOUS
Qty: 0 | Refills: 0 | Status: DISCONTINUED | OUTPATIENT
Start: 2019-02-14 | End: 2019-02-16

## 2019-02-14 RX ADMIN — SODIUM CHLORIDE 100 MILLILITER(S): 9 INJECTION, SOLUTION INTRAVENOUS at 17:32

## 2019-02-14 RX ADMIN — MORPHINE SULFATE 4 MILLIGRAM(S): 50 CAPSULE, EXTENDED RELEASE ORAL at 05:56

## 2019-02-14 RX ADMIN — SODIUM CHLORIDE 1000 MILLILITER(S): 9 INJECTION INTRAMUSCULAR; INTRAVENOUS; SUBCUTANEOUS at 09:56

## 2019-02-14 RX ADMIN — ONDANSETRON 4 MILLIGRAM(S): 8 TABLET, FILM COATED ORAL at 08:55

## 2019-02-14 RX ADMIN — MORPHINE SULFATE 4 MILLIGRAM(S): 50 CAPSULE, EXTENDED RELEASE ORAL at 07:06

## 2019-02-14 RX ADMIN — SODIUM CHLORIDE 1000 MILLILITER(S): 9 INJECTION INTRAMUSCULAR; INTRAVENOUS; SUBCUTANEOUS at 05:42

## 2019-02-14 RX ADMIN — Medication 10 MILLIGRAM(S): at 14:00

## 2019-02-14 RX ADMIN — MORPHINE SULFATE 4 MILLIGRAM(S): 50 CAPSULE, EXTENDED RELEASE ORAL at 10:32

## 2019-02-14 RX ADMIN — SODIUM CHLORIDE 3 MILLILITER(S): 9 INJECTION INTRAMUSCULAR; INTRAVENOUS; SUBCUTANEOUS at 05:56

## 2019-02-14 RX ADMIN — ONDANSETRON 4 MILLIGRAM(S): 8 TABLET, FILM COATED ORAL at 05:56

## 2019-02-14 RX ADMIN — SODIUM CHLORIDE 1000 MILLILITER(S): 9 INJECTION INTRAMUSCULAR; INTRAVENOUS; SUBCUTANEOUS at 13:54

## 2019-02-14 RX ADMIN — Medication 25 MILLIGRAM(S): at 07:15

## 2019-02-14 RX ADMIN — SODIUM CHLORIDE 1000 MILLILITER(S): 9 INJECTION INTRAMUSCULAR; INTRAVENOUS; SUBCUTANEOUS at 08:56

## 2019-02-14 RX ADMIN — MORPHINE SULFATE 2 MILLIGRAM(S): 50 CAPSULE, EXTENDED RELEASE ORAL at 13:59

## 2019-02-14 NOTE — H&P ADULT - ASSESSMENT
28F with PMH of spina bifida s/p  shunt placement (2 days old) w/ revision (5 years old) and spinal cord detethering presents with pain, nausea, and emesis that began last night.    - Admit to surgery, Dr. Encinas  - NG tube  - NPO, IV fluids  - No pain medication  - Will check lactate and x-ray to confirm NG tube placement  - Gottlieb  - Seen and examined with attending, Dr. Huang QUAN team surgery  Pager [phone]

## 2019-02-14 NOTE — ED ADULT NURSE NOTE - OBJECTIVE STATEMENT
pt is a 28 y.o. female c/o abdominal pain, nausea and constipation x1 day. pt was recently on vacation in [place] with family, came home on flight last night around 11pm. pt began to "feel strange" while on the plane and has not been able to eat/sleep/drink since. pt has history of spina bifida and SBO in the past. pt self-catheterizes for urine. pt is accompanied by mother. pain scale at rest is 8/10. vital signs as charted. will continue to monitor.

## 2019-02-14 NOTE — ED PROVIDER NOTE - OBJECTIVE STATEMENT
28 y.o female pmhx of spina bifida, hydrocephalus s/p  shunt, self caths, SBO 2 years ago transferred from [place] for new SBO. pt states around 7 am developed sudden onset of nausea, vomiting and abdominal pain that is identical to her last SBO. At [place] CT showed SBO with transition point in R lower quardant, NG tube placed. Pt states still feels nausea. pt transferred here and accepted by Dr. Encinas, Denies fevers, headache, dizziness, chest pain, sob, cough, urinary symptoms. 28 y.o female pmhx of spina bifida, hydrocephalus s/p  shunt, self caths, SBO 2 years ago transferred from [place] for new SBO. pt states around 7 am developed sudden onset of nausea, vomiting and abdominal pain that is identical to her last SBO. At [place] CT showed SBO with transition point in R lower quardant, NG tube placed. Pt states still feels nausea and abdominal pain. pt transferred here and accepted by Dr. Encinas, Denies fevers, headache, dizziness, chest pain, sob, cough, urinary symptoms.

## 2019-02-14 NOTE — PATIENT PROFILE ADULT - INFORMATION PROVIDED TO:
Patient: Alberto Guzman    Procedure Summary     Date:  08/29/18 Room / Location:  [facility] OR 06 /  [facility] OR    Anesthesia Start:  0813 Anesthesia Stop:  0840    Procedure:  CYSTOSCOPY TRANSURETHRAL RESECTION OF BLADDER TUMOR (N/A ) Diagnosis:       Bladder tumor      (Bladder tumor [D49.4])    Surgeon:  Alfonso Collins MD Provider:  Kenney Medina MD    Anesthesia Type:  general ASA Status:  3          Anesthesia Type: general  Last vitals  BP   (!) 89/44 (08/29/18 0846)   Temp   97 °F (36.1 °C) (08/29/18 0841)   Pulse   66 (08/29/18 0846)   Resp   12 (08/29/18 0846)     SpO2   100 % (08/29/18 0846)     Post Anesthesia Care and Evaluation    Patient location during evaluation: PHASE II  Patient participation: complete - patient participated  Level of consciousness: awake and alert  Pain score: 1  Pain management: adequate  Airway patency: patent  Anesthetic complications: No anesthetic complications  PONV Status: controlled  Cardiovascular status: acceptable  Respiratory status: acceptable  Hydration status: acceptable       patient

## 2019-02-14 NOTE — ED PROVIDER NOTE - CLINICAL SUMMARY MEDICAL DECISION MAKING FREE TEXT BOX
pt with nausea and abdominal pain ho sbo will get labs and ct pt with nausea and abdominal pain ho sbo will get labs and ct.    Pt request transfer to [facility] for SBO.

## 2019-02-14 NOTE — ED ADULT NURSE NOTE - OBJECTIVE STATEMENT
Pt received a&ox3, sent in as transfer from [place] for SBO confirmed by ct scan, pt c/o NV/generalized abd pain x 730 last night, pt still w mild pain/nv, medicated as per orders, pt arrived w nc in, hooked up to continuos suction as per PA order as per sx, draining small amounts of brown fluid, MD made aware of small amount of drainage, 20 gauge IV in left ac via [place], MD eval performed, will continue to monitor.

## 2019-02-14 NOTE — ED ADULT NURSE REASSESSMENT NOTE - NS ED NURSE REASSESS COMMENT FT1
Pt seen by Sx, NG tube interrogated and flushed by Sx, draining appropriately as per sx, on80 continuous suction, pt comfortable, endorses relief of pain/nausea, NAD.

## 2019-02-14 NOTE — ED PROVIDER NOTE - CLINICAL SUMMARY MEDICAL DECISION MAKING FREE TEXT BOX
28 y.o female pmhx of spina bifida, hydrocephalus s/p  shunt, self caths, SBO 2 years ago transferred from [place] for new [facility] [place]. NGT tube in place. Pain control, antiemetics and Surgery consult. 28 y.o female pmhx of spina bifida, hydrocephalus s/p  shunt, self caths, SBO 2 years ago transferred from [place] for [facility]. NGT tube in place. Will contact surgery. Jarett: 28 y.o female pmhx of spina bifida, hydrocephalus s/p  shunt, self caths, SBO 2 years ago transferred from [place] for [facility]. NGT tube in place. Will contact surgery.

## 2019-02-14 NOTE — ED ADULT NURSE NOTE - NSIMPLEMENTINTERV_GEN_ALL_ED
Implemented All Fall Risk Interventions:  [place] to call system. Call bell, personal items and telephone within reach. Instruct patient to call for assistance. Room bathroom lighting operational. Non-slip footwear when patient is off stretcher. Physically safe environment: no spills, clutter or unnecessary equipment. Stretcher in lowest position, wheels locked, appropriate side rails in place. Provide visual cue, wrist band, yellow gown, etc. Monitor gait and stability. Monitor for mental status changes and reorient to person, place, and time. Review medications for side effects contributing to fall risk. Reinforce activity limits and safety measures with patient and family.

## 2019-02-14 NOTE — ED PROVIDER NOTE - PROGRESS NOTE DETAILS
so am doctor with labs and ct B Nitin DO Attending Bryon, reviewed with pt and mother results of tests.  Pt request to be transferred to [facility] under care of surgeon who cared for her Dr. Naldo Manuel.  Transfer center called Attending Bryon, d/w Shae lin at transfer center and Dr. Manuel is no longer at [facility]. Pt and family want to go to [facility] and oncall surgeon [facility] called - Dr. Encinas.  D/w Dr. Encinas and agrees to accept.  Will plan ER to ER transfer Attending Slater, d/w pt and mother about transfer r/b/a d/w pt and wants transfer for continuity of care. Attending Bryon, d/w Shae lin at transfer center and Dr. Manuel is no longer at [facility]. Pt and family want to go to [facility] and oncall surgeon [facility] called - Dr. Encinas.  D/w Dr. Encinas and agrees to accept.  Will plan ER to ER transfer.  D/w Dr. Oden and will accept in ER

## 2019-02-14 NOTE — H&P ADULT - NSHPLABSRESULTS_GEN_ALL_CORE
T(C): 36.5 (02-14-19 @ 16:28), Max: 37.1 (02-14-19 @ 11:48)  HR: 113 (02-14-19 @ 16:28) (96 - 121)  BP: 106/76 (02-14-19 @ 16:28) (106/76 - 143/98)  RR: 18 (02-14-19 @ 16:28) (16 - 18)  SpO2: 100% (02-14-19 @ 16:28) (97% - 100%)    LABS:                        14.0   10.07 )-----------( 318      ( 14 Feb 2019 05:32 )             41.7     14 Feb 2019 05:32    139    |  106    |  17     ----------------------------<  116    3.8     |  22     |  0.58     Ca    9.3        14 Feb 2019 05:32    TPro  8.3    /  Alb  4.4    /  TBili  0.4    /  DBili  x      /  AST  26     /  ALT  39     /  AlkPhos  58     14 Feb 2019 05:32    PT/INR - ( 14 Feb 2019 05:32 )   PT: 11.1 sec;   INR: 1.00 ratio         PTT - ( 14 Feb 2019 05:32 )  PTT:30.1 sec      IMAGING    CT Abdomen and Pelvis w/ Oral Cont and w/ IV Cont (02.14.19 @ 08:42)    FINDINGS:    LOWER CHEST: Within normal limits.    LIVER: Within normal limits.  BILE DUCTS: Normal caliber.  GALLBLADDER: Within normal limits.  SPLEEN: Within normal limits.  PANCREAS: Within normal limits.  ADRENALS: Within normal limits.  KIDNEYS/URETERS: Within normal limits.    BLADDER: Within normal limits.  REPRODUCTIVE ORGANS: Uterus and adnexa are within normal limits.    BOWEL: There is dilatation of small bowel loops with transition point in   the right lower quadrant (2:61) consistent with small bowel obstruction.   Appendix is not visualized.  PERITONEUM: Small amount of free fluid in the pelvis. The distal portion   of the ventriculoperitoneal shunt is noted. No pneumoperitoneum.  VESSELS:  Within normal limits.  RETROPERITONEUM: No lymphadenopathy.    ABDOMINAL WALL: Within normal limits.  BONES: Scoliosis. Bilateral shallow acetabula. Chronic dislocation of the   bilateral hips.    IMPRESSION:     Small bowel obstruction with transition point in the right lower quadrant.    Small amount of free fluid in the pelvis. No pneumoperitoneum.

## 2019-02-14 NOTE — ED PROVIDER NOTE - NSRISKOFTRANSFER_ED_A_ED
Transportation Risk (There is always a risk of traffic delays resulting in deterioration of condition.)/Deterioration of Condition En Route/Increased Pain

## 2019-02-14 NOTE — ED CLERICAL - NS ED CLERK NOTE PRE-ARRIVAL INFORMATION; PCP CONTACT INFORMATION
Abd pain, vomiting. NGT placed.  SBO to be seen by Dr Encinas.  treated with MS, Zofran. VSS afebrile.  Hx spina bifida,  shunt

## 2019-02-14 NOTE — H&P ADULT - HISTORY OF PRESENT ILLNESS
28F with PMH of spina bifida s/p  shunt placement (2 days old) w/ revision (5 years old) and spinal cord detethering presents with pain, nausea, and emesis that began last night. She states the pain began at 8pm on 2/13 and she began vomiting at 10am on 2/14. She last passed flatus yesterday and had a small bowel movement today. The pain has been in her lower abdomen. Denies fevers or chills.    She was admitted in 2016 with a small bowel obstruction that resolved with nonoperative management. She is ambulatory. She is not able to void due to her spina bifida and requires self-catheterization. She takes no medications.    She presented to [facility] ED where she was afebrile, hemodynamically normal, WBC count 10, Cr 0.5, and CT showed SBO with transition point in the RLQ and small volume ascites. NG tube was placed at [place].

## 2019-02-14 NOTE — ED ADULT NURSE NOTE - NSIMPLEMENTINTERV_GEN_ALL_ED
Implemented All Universal Safety Interventions:  [place] to call system. Call bell, personal items and telephone within reach. Instruct patient to call for assistance. Room bathroom lighting operational. Non-slip footwear when patient is off stretcher. Physically safe environment: no spills, clutter or unnecessary equipment. Stretcher in lowest position, wheels locked, appropriate side rails in place.

## 2019-02-14 NOTE — ED PROVIDER NOTE - OBJECTIVE STATEMENT
27 yo female with spina bifida ambulatory with crutches self caths pw nausea and abdominal pain. pt has ho sbo and states this feels like when she was obstructed before

## 2019-02-14 NOTE — H&P ADULT - NSHPPHYSICALEXAM_GEN_ALL_CORE
General: Alert, NAD  Neuro: A+Ox3  HEENT: NC/AT, no asymmetry, no scleral icterus  Cardio: RRR  Resp: Airway patent, unlabored breathing  GI/Abd: Soft, mild tenderness in bilateral lower quadrants (had received morphine), nondistended, no masses, no peritoneal signs  Ext: Warm, no edema, full ROM

## 2019-02-15 LAB
ANION GAP SERPL CALC-SCNC: 13 MMO/L — SIGNIFICANT CHANGE UP (ref 7–14)
BUN SERPL-MCNC: 8 MG/DL — SIGNIFICANT CHANGE UP (ref 7–23)
CALCIUM SERPL-MCNC: 8.3 MG/DL — LOW (ref 8.4–10.5)
CHLORIDE SERPL-SCNC: 105 MMOL/L — SIGNIFICANT CHANGE UP (ref 98–107)
CO2 SERPL-SCNC: 23 MMOL/L — SIGNIFICANT CHANGE UP (ref 22–31)
CREAT SERPL-MCNC: 0.47 MG/DL — LOW (ref 0.5–1.3)
GLUCOSE SERPL-MCNC: 82 MG/DL — SIGNIFICANT CHANGE UP (ref 70–99)
HCT VFR BLD CALC: 33.2 % — LOW (ref 34.5–45)
HGB BLD-MCNC: 10.8 G/DL — LOW (ref 11.5–15.5)
MAGNESIUM SERPL-MCNC: 2.1 MG/DL — SIGNIFICANT CHANGE UP (ref 1.6–2.6)
MCHC RBC-ENTMCNC: 32 PG — SIGNIFICANT CHANGE UP (ref 27–34)
MCHC RBC-ENTMCNC: 32.5 % — SIGNIFICANT CHANGE UP (ref 32–36)
MCV RBC AUTO: 98.2 FL — SIGNIFICANT CHANGE UP (ref 80–100)
NRBC # FLD: 0 K/UL — LOW (ref 25–125)
PHOSPHATE SERPL-MCNC: 3.2 MG/DL — SIGNIFICANT CHANGE UP (ref 2.5–4.5)
PLATELET # BLD AUTO: 235 K/UL — SIGNIFICANT CHANGE UP (ref 150–400)
PMV BLD: 10.8 FL — SIGNIFICANT CHANGE UP (ref 7–13)
POTASSIUM SERPL-MCNC: 3.7 MMOL/L — SIGNIFICANT CHANGE UP (ref 3.5–5.3)
POTASSIUM SERPL-SCNC: 3.7 MMOL/L — SIGNIFICANT CHANGE UP (ref 3.5–5.3)
RBC # BLD: 3.38 M/UL — LOW (ref 3.8–5.2)
RBC # FLD: 12.9 % — SIGNIFICANT CHANGE UP (ref 10.3–14.5)
SODIUM SERPL-SCNC: 141 MMOL/L — SIGNIFICANT CHANGE UP (ref 135–145)
WBC # BLD: 9.76 K/UL — SIGNIFICANT CHANGE UP (ref 3.8–10.5)
WBC # FLD AUTO: 9.76 K/UL — SIGNIFICANT CHANGE UP (ref 3.8–10.5)

## 2019-02-15 RX ORDER — IBUPROFEN 200 MG
600 TABLET ORAL EVERY 6 HOURS
Qty: 0 | Refills: 0 | Status: DISCONTINUED | OUTPATIENT
Start: 2019-02-15 | End: 2019-02-15

## 2019-02-15 RX ORDER — ACETAMINOPHEN 500 MG
1000 TABLET ORAL ONCE
Qty: 0 | Refills: 0 | Status: COMPLETED | OUTPATIENT
Start: 2019-02-15 | End: 2019-02-15

## 2019-02-15 RX ORDER — DIPHENHYDRAMINE HCL 50 MG
25 CAPSULE ORAL ONCE
Qty: 0 | Refills: 0 | Status: COMPLETED | OUTPATIENT
Start: 2019-02-15 | End: 2019-02-15

## 2019-02-15 RX ORDER — INFLUENZA VIRUS VACCINE 15; 15; 15; 15 UG/.5ML; UG/.5ML; UG/.5ML; UG/.5ML
0.5 SUSPENSION INTRAMUSCULAR ONCE
Qty: 0 | Refills: 0 | Status: COMPLETED | OUTPATIENT
Start: 2019-02-15 | End: 2019-02-15

## 2019-02-15 RX ORDER — POTASSIUM CHLORIDE 20 MEQ
10 PACKET (EA) ORAL ONCE
Qty: 0 | Refills: 0 | Status: COMPLETED | OUTPATIENT
Start: 2019-02-15 | End: 2019-02-15

## 2019-02-15 RX ADMIN — SODIUM CHLORIDE 100 MILLILITER(S): 9 INJECTION, SOLUTION INTRAVENOUS at 08:55

## 2019-02-15 RX ADMIN — Medication 1000 MILLIGRAM(S): at 14:10

## 2019-02-15 RX ADMIN — Medication 1000 MILLIGRAM(S): at 05:30

## 2019-02-15 RX ADMIN — ENOXAPARIN SODIUM 40 MILLIGRAM(S): 100 INJECTION SUBCUTANEOUS at 04:53

## 2019-02-15 RX ADMIN — Medication 400 MILLIGRAM(S): at 13:40

## 2019-02-15 RX ADMIN — Medication 25 MILLIGRAM(S): at 21:24

## 2019-02-15 RX ADMIN — Medication 100 MILLIEQUIVALENT(S): at 10:55

## 2019-02-15 RX ADMIN — Medication 400 MILLIGRAM(S): at 04:53

## 2019-02-15 NOTE — PROVIDER CONTACT NOTE (OTHER) - ASSESSMENT
Patient A&Ox4. VSS. Patient complaining of headache, around eyes, stating that it feels like sinus headache.  Pain at is 8 out of 10

## 2019-02-15 NOTE — PROGRESS NOTE ADULT - SUBJECTIVE AND OBJECTIVE BOX
Surgery Progress Note    S:     Patient seen and examined. No acute events overnight. Feeling well this morning, no nausea/vomiting since the ER waiting room last night. No flatus or BM yet, will monitor.    O:    Vital Signs Last 24 Hrs  T(C): 36.7 (15 Feb 2019 04:50), Max: 37.2 (15 Feb 2019 02:00)  T(F): 98.1 (15 Feb 2019 04:50), Max: 98.9 (15 Feb 2019 02:00)  HR: 99 (15 Feb 2019 04:50) (95 - 117)  BP: 112/74 (15 Feb 2019 04:50) (103/65 - 124/83)  BP(mean): --  RR: 16 (15 Feb 2019 04:50) (16 - 18)  SpO2: 99% (15 Feb 2019 04:50) (97% - 100%)    Physical Exam:  Gen: NAD, NGT in place to LCWS  Resp: Unlabored breathing  Abd: soft, NTND, no rebound or guarding  Ext: WWP  Skin: No rashes    I&O's Detail    14 Feb 2019 07:01  -  15 Feb 2019 07:00  --------------------------------------------------------  IN:    lactated ringers.: 1200 mL  Total IN: 1200 mL    OUT:    Indwelling Catheter - Urethral: 1200 mL    Nasoenteral Tube: 300 mL  Total OUT: 1500 mL    Total NET: -300 mL          MEDICATIONS  (STANDING):  enoxaparin Injectable 40 milliGRAM(s) SubCutaneous every 24 hours  influenza   Vaccine 0.5 milliLiter(s) IntraMuscular once  lactated ringers. 1000 milliLiter(s) (100 mL/Hr) IV Continuous <Continuous>  potassium chloride  10 mEq/100 mL IVPB 10 milliEquivalent(s) IV Intermittent once    MEDICATIONS  (PRN):  ibuprofen  Tablet. 600 milliGRAM(s) Oral every 6 hours PRN Mild Pain (1 - 3)      Labs:                          10.8   9.76  )-----------( 235      ( 15 Feb 2019 05:23 )             33.2       02-15    141  |  105  |  8   ----------------------------<  82  3.7   |  23  |  0.47<L>    Ca    8.3<L>      15 Feb 2019 05:23  Phos  3.2     02-15  Mg     2.1     02-15    TPro  8.3  /  Alb  4.4  /  TBili  0.4  /  DBili  x   /  AST  26  /  ALT  39  /  AlkPhos  58  02-14      Radiology:  < from: CT Abdomen and Pelvis w/ Oral Cont and w/ IV Cont (02.14.19 @ 08:42) >  EXAM:  CT ABDOMEN AND PELVIS OC IC                            PROCEDURE DATE:  02/14/2019          INTERPRETATION:  CLINICAL INFORMATION: 28-year-old female with abdominal   pain and history of small bowel obstruction.         COMPARISON: 12/29/2017    PROCEDURE:   CT of the Abdomen and Pelvis was performed with intravenous contrast.   Intravenous contrast: 90 ml Omnipaque 350. 10 ml discarded.  Oral contrast: positive contrast was administered.  Sagittal and coronal reformats were performed.    FINDINGS:    LOWER CHEST: Within normal limits.    LIVER: Within normal limits.  BILE DUCTS: Normal caliber.  GALLBLADDER: Within normal limits.  SPLEEN: Within normal limits.  PANCREAS: Within normal limits.  ADRENALS: Within normal limits.  KIDNEYS/URETERS: Within normal limits.    BLADDER: Within normal limits.  REPRODUCTIVE ORGANS: Uterus and adnexa are within normal limits.    BOWEL: There is dilatation of small bowel loops with transition point in   the right lower quadrant (2:61) consistent with small bowel obstruction.   Appendix is not visualized.  PERITONEUM: Small amount of free fluid in the pelvis. The distal portion   of the ventriculoperitoneal shunt is noted. No pneumoperitoneum.  VESSELS:  Within normal limits.  RETROPERITONEUM: No lymphadenopathy.    ABDOMINAL WALL: Within normal limits.  BONES: Scoliosis. Bilateral shallow acetabula. Chronic dislocation of the   bilateral hips.    IMPRESSION:     Small bowel obstruction with transition point in the right lower quadrant.    Small amount of free fluid in the pelvis. No pneumoperitoneum.    < end of copied text >

## 2019-02-16 ENCOUNTER — TRANSCRIPTION ENCOUNTER (OUTPATIENT)
Age: 29
End: 2019-02-16

## 2019-02-16 LAB
ANION GAP SERPL CALC-SCNC: 19 MMO/L — HIGH (ref 7–14)
BUN SERPL-MCNC: 13 MG/DL — SIGNIFICANT CHANGE UP (ref 7–23)
CALCIUM SERPL-MCNC: 8.4 MG/DL — SIGNIFICANT CHANGE UP (ref 8.4–10.5)
CHLORIDE SERPL-SCNC: 102 MMOL/L — SIGNIFICANT CHANGE UP (ref 98–107)
CO2 SERPL-SCNC: 18 MMOL/L — LOW (ref 22–31)
CREAT SERPL-MCNC: 0.48 MG/DL — LOW (ref 0.5–1.3)
GLUCOSE SERPL-MCNC: 52 MG/DL — LOW (ref 70–99)
HCT VFR BLD CALC: 34 % — LOW (ref 34.5–45)
HGB BLD-MCNC: 10.6 G/DL — LOW (ref 11.5–15.5)
MAGNESIUM SERPL-MCNC: 2 MG/DL — SIGNIFICANT CHANGE UP (ref 1.6–2.6)
MCHC RBC-ENTMCNC: 31.2 % — LOW (ref 32–36)
MCHC RBC-ENTMCNC: 31.8 PG — SIGNIFICANT CHANGE UP (ref 27–34)
MCV RBC AUTO: 102.1 FL — HIGH (ref 80–100)
NRBC # FLD: 0 K/UL — LOW (ref 25–125)
PHOSPHATE SERPL-MCNC: 4.5 MG/DL — SIGNIFICANT CHANGE UP (ref 2.5–4.5)
PLATELET # BLD AUTO: 209 K/UL — SIGNIFICANT CHANGE UP (ref 150–400)
PMV BLD: 10.2 FL — SIGNIFICANT CHANGE UP (ref 7–13)
POTASSIUM SERPL-MCNC: 4.5 MMOL/L — SIGNIFICANT CHANGE UP (ref 3.5–5.3)
POTASSIUM SERPL-SCNC: 4.5 MMOL/L — SIGNIFICANT CHANGE UP (ref 3.5–5.3)
RBC # BLD: 3.33 M/UL — LOW (ref 3.8–5.2)
RBC # FLD: 12.4 % — SIGNIFICANT CHANGE UP (ref 10.3–14.5)
SODIUM SERPL-SCNC: 139 MMOL/L — SIGNIFICANT CHANGE UP (ref 135–145)
WBC # BLD: 6.9 K/UL — SIGNIFICANT CHANGE UP (ref 3.8–10.5)
WBC # FLD AUTO: 6.9 K/UL — SIGNIFICANT CHANGE UP (ref 3.8–10.5)

## 2019-02-16 RX ORDER — SODIUM CHLORIDE 9 MG/ML
1000 INJECTION, SOLUTION INTRAVENOUS
Qty: 0 | Refills: 0 | Status: DISCONTINUED | OUTPATIENT
Start: 2019-02-16 | End: 2019-02-16

## 2019-02-16 RX ORDER — ACETAMINOPHEN 500 MG
650 TABLET ORAL ONCE
Qty: 0 | Refills: 0 | Status: COMPLETED | OUTPATIENT
Start: 2019-02-16 | End: 2019-02-16

## 2019-02-16 RX ORDER — ACETAMINOPHEN 500 MG
1000 TABLET ORAL ONCE
Qty: 0 | Refills: 0 | Status: COMPLETED | OUTPATIENT
Start: 2019-02-16 | End: 2019-02-16

## 2019-02-16 RX ORDER — DIPHENHYDRAMINE HCL 50 MG
25 CAPSULE ORAL ONCE
Qty: 0 | Refills: 0 | Status: COMPLETED | OUTPATIENT
Start: 2019-02-16 | End: 2019-02-16

## 2019-02-16 RX ADMIN — Medication 25 MILLIGRAM(S): at 01:51

## 2019-02-16 RX ADMIN — Medication 1000 MILLIGRAM(S): at 01:10

## 2019-02-16 RX ADMIN — Medication 400 MILLIGRAM(S): at 00:47

## 2019-02-16 RX ADMIN — SODIUM CHLORIDE 100 MILLILITER(S): 9 INJECTION, SOLUTION INTRAVENOUS at 09:30

## 2019-02-16 RX ADMIN — Medication 650 MILLIGRAM(S): at 13:04

## 2019-02-16 RX ADMIN — Medication 650 MILLIGRAM(S): at 12:12

## 2019-02-16 RX ADMIN — Medication 25 MILLIGRAM(S): at 22:04

## 2019-02-16 RX ADMIN — ENOXAPARIN SODIUM 40 MILLIGRAM(S): 100 INJECTION SUBCUTANEOUS at 05:57

## 2019-02-16 NOTE — PROVIDER CONTACT NOTE (OTHER) - ASSESSMENT
Pt A&O4. Pt denies N/V, pt states she has acid reflux. Pt has been passing gas overnight. Pt asking if suction is needed again.

## 2019-02-16 NOTE — PROGRESS NOTE ADULT - ASSESSMENT
28F with PMH of spina bifida s/p  shunt placement (2 days old) w/ revision (5 years old) and spinal cord detethering admitted for SBO, NGT in place.    - Passed NGT clamp trial, will remove this am  - will advance to CLD today  - Avoid narcotics  - Continue rendon for 24 more hours for patient preference (straight caths at home)  - Monitor GI function and po tolerance    B team surgery  Pager [phone]

## 2019-02-16 NOTE — PROGRESS NOTE ADULT - SUBJECTIVE AND OBJECTIVE BOX
Surgery Progress Note    S:     Patient seen and examined. NGT clamped overnight, patient tolerated well without nausea or vomiting. Returned to suction this am, 200-300 clear fluid. Plan to remove NGT and start CLD this morning.    O:    Vital Signs Last 24 Hrs  T(C): 36.9 (16 Feb 2019 05:57), Max: 37.7 (15 Feb 2019 17:00)  T(F): 98.5 (16 Feb 2019 05:57), Max: 99.8 (15 Feb 2019 17:00)  HR: 81 (16 Feb 2019 05:57) (81 - 113)  BP: 104/69 (16 Feb 2019 05:57) (104/69 - 114/73)  BP(mean): --  RR: 18 (16 Feb 2019 05:57) (16 - 18)  SpO2: 99% (16 Feb 2019 05:57) (98% - 100%)    Physical Exam:  Gen: NAD  Resp: Unlabored breathing  Abd: soft, NTND, no rebound or guarding  Ext: WWP  Skin: No rashes    I&O's Detail    15 Feb 2019 07:01  -  16 Feb 2019 07:00  --------------------------------------------------------  IN:  Total IN: 0 mL    OUT:    Indwelling Catheter - Urethral: 3150 mL    Nasoenteral Tube: 50 mL  Total OUT: 3200 mL    Total NET: -3200 mL          MEDICATIONS  (STANDING):  dextrose 5% + sodium chloride 0.45% 1000 milliLiter(s) (100 mL/Hr) IV Continuous <Continuous>  enoxaparin Injectable 40 milliGRAM(s) SubCutaneous every 24 hours  influenza   Vaccine 0.5 milliLiter(s) IntraMuscular once    MEDICATIONS  (PRN):      Labs:                          10.6   6.90  )-----------( 209      ( 16 Feb 2019 06:00 )             34.0       02-16    139  |  102  |  13  ----------------------------<  52<L>  4.5   |  18<L>  |  0.48<L>    Ca    8.4      16 Feb 2019 06:00  Phos  4.5     02-16  Mg     2.0     02-16        Radiology:  no new imaging

## 2019-02-16 NOTE — PROVIDER CONTACT NOTE (OTHER) - BACKGROUND
Pt admitted for SBO. Pt has a PMH of spina bifida and PSH of  shunt placement. Suction to NG tube was d/c, NG tube kept clamped overnight, pt monitored for N/V.

## 2019-02-17 VITALS
OXYGEN SATURATION: 99 % | SYSTOLIC BLOOD PRESSURE: 103 MMHG | RESPIRATION RATE: 18 BRPM | DIASTOLIC BLOOD PRESSURE: 66 MMHG | TEMPERATURE: 99 F | HEART RATE: 92 BPM

## 2019-02-17 LAB
ANION GAP SERPL CALC-SCNC: 12 MMO/L — SIGNIFICANT CHANGE UP (ref 7–14)
APPEARANCE UR: SIGNIFICANT CHANGE UP
BACTERIA # UR AUTO: SIGNIFICANT CHANGE UP
BILIRUB UR-MCNC: NEGATIVE — SIGNIFICANT CHANGE UP
BLOOD UR QL VISUAL: NEGATIVE — SIGNIFICANT CHANGE UP
BUN SERPL-MCNC: 13 MG/DL — SIGNIFICANT CHANGE UP (ref 7–23)
CALCIUM SERPL-MCNC: 8.9 MG/DL — SIGNIFICANT CHANGE UP (ref 8.4–10.5)
CHLORIDE SERPL-SCNC: 101 MMOL/L — SIGNIFICANT CHANGE UP (ref 98–107)
CO2 SERPL-SCNC: 26 MMOL/L — SIGNIFICANT CHANGE UP (ref 22–31)
COLOR SPEC: SIGNIFICANT CHANGE UP
CREAT SERPL-MCNC: 0.55 MG/DL — SIGNIFICANT CHANGE UP (ref 0.5–1.3)
GLUCOSE SERPL-MCNC: 80 MG/DL — SIGNIFICANT CHANGE UP (ref 70–99)
GLUCOSE UR-MCNC: NEGATIVE — SIGNIFICANT CHANGE UP
HCT VFR BLD CALC: 36.7 % — SIGNIFICANT CHANGE UP (ref 34.5–45)
HGB BLD-MCNC: 11.7 G/DL — SIGNIFICANT CHANGE UP (ref 11.5–15.5)
HYALINE CASTS # UR AUTO: SIGNIFICANT CHANGE UP
KETONES UR-MCNC: NEGATIVE — SIGNIFICANT CHANGE UP
LEUKOCYTE ESTERASE UR-ACNC: SIGNIFICANT CHANGE UP
MAGNESIUM SERPL-MCNC: 2 MG/DL — SIGNIFICANT CHANGE UP (ref 1.6–2.6)
MCHC RBC-ENTMCNC: 31.5 PG — SIGNIFICANT CHANGE UP (ref 27–34)
MCHC RBC-ENTMCNC: 31.9 % — LOW (ref 32–36)
MCV RBC AUTO: 98.9 FL — SIGNIFICANT CHANGE UP (ref 80–100)
NITRITE UR-MCNC: NEGATIVE — SIGNIFICANT CHANGE UP
NRBC # FLD: 0 K/UL — LOW (ref 25–125)
PH UR: 7 — SIGNIFICANT CHANGE UP (ref 5–8)
PHOSPHATE SERPL-MCNC: 3.5 MG/DL — SIGNIFICANT CHANGE UP (ref 2.5–4.5)
PLATELET # BLD AUTO: 240 K/UL — SIGNIFICANT CHANGE UP (ref 150–400)
PMV BLD: 10.2 FL — SIGNIFICANT CHANGE UP (ref 7–13)
POTASSIUM SERPL-MCNC: 3.7 MMOL/L — SIGNIFICANT CHANGE UP (ref 3.5–5.3)
POTASSIUM SERPL-SCNC: 3.7 MMOL/L — SIGNIFICANT CHANGE UP (ref 3.5–5.3)
PROT UR-MCNC: 10 — SIGNIFICANT CHANGE UP
RBC # BLD: 3.71 M/UL — LOW (ref 3.8–5.2)
RBC # FLD: 12.5 % — SIGNIFICANT CHANGE UP (ref 10.3–14.5)
RBC CASTS # UR COMP ASSIST: HIGH (ref 0–?)
SODIUM SERPL-SCNC: 139 MMOL/L — SIGNIFICANT CHANGE UP (ref 135–145)
SP GR SPEC: 1.01 — SIGNIFICANT CHANGE UP (ref 1–1.04)
SQUAMOUS # UR AUTO: SIGNIFICANT CHANGE UP
UROBILINOGEN FLD QL: NORMAL — SIGNIFICANT CHANGE UP
WBC # BLD: 6.55 K/UL — SIGNIFICANT CHANGE UP (ref 3.8–10.5)
WBC # FLD AUTO: 6.55 K/UL — SIGNIFICANT CHANGE UP (ref 3.8–10.5)
WBC UR QL: HIGH (ref 0–?)

## 2019-02-17 RX ORDER — PSYLLIUM SEED (WITH DEXTROSE)
1 POWDER (GRAM) ORAL ONCE
Qty: 0 | Refills: 0 | Status: COMPLETED | OUTPATIENT
Start: 2019-02-17 | End: 2019-02-17

## 2019-02-17 RX ADMIN — Medication 1 PACKET(S): at 11:54

## 2019-02-17 RX ADMIN — ENOXAPARIN SODIUM 40 MILLIGRAM(S): 100 INJECTION SUBCUTANEOUS at 05:44

## 2019-02-17 RX ADMIN — Medication 30 MILLILITER(S): at 07:55

## 2019-02-17 NOTE — DISCHARGE NOTE ADULT - HOSPITAL COURSE
28F with PMH of spina bifida s/p  shunt placement (2 days old) w/ revision (5 years old) and spinal cord detethering admitted with SBO. She was treated conservatively with NPO, NGT, and IVF. She began showing signs of bowl function on HD2. The NGT was clamped and then removed on HD3. Her diet was advanced as tolerated. By HD4, she was advanced to regular diet and tolerating regular food without abdominal pain and continuing to have bowel function. During the course of her hospitalization, she had a rendon catheter in place for her comfort as IVF hydration would have require frequent self-catheterizations. The rendon catheter was removed on HD4 and she was able to self-catheterize as she normally does at home without any issues. At the time of discharge, she was given strict return precautions to return to the ED should she develop recurrent symptoms of bowel obstruction (nausea and perfuse vomiting, abdominal pain, lack of passing flatus or stool). She was instructed to follow-up with Dr. Encinas as an outpatient.

## 2019-02-17 NOTE — DISCHARGE NOTE ADULT - PATIENT PORTAL LINK FT
You can access the [facility] Patient Portal, offered by [facility], by registering with the following website: [email or web address]

## 2019-02-17 NOTE — DISCHARGE NOTE ADULT - CARE PLAN
Principal Discharge DX:	SBO (small bowel obstruction)  Goal:	Resolution of bowel obstruction, diet tolerance, pain control  Assessment and plan of treatment:	You were found to have a small bowel obstruction. Your obstruction resolved with conservative management.  DIET: Return to your usual diet.  RETURN PRECAUTIONS: hould she develop recurrent symptoms of bowel obstruction (nausea and perfuse vomiting, abdominal pain, lack of passing flatus or stool).   FOLLOW-UP: Please follow up with your primary care physician in one week regarding your hospitalization. Please also follow-up with Dr. Encinas as an outpatient in 2-3 weeks from discharge.

## 2019-02-17 NOTE — DISCHARGE NOTE ADULT - PLAN OF CARE
Resolution of bowel obstruction, diet tolerance, pain control You were found to have a small bowel obstruction. Your obstruction resolved with conservative management.  DIET: Return to your usual diet.  RETURN PRECAUTIONS: hould she develop recurrent symptoms of bowel obstruction (nausea and perfuse vomiting, abdominal pain, lack of passing flatus or stool).   FOLLOW-UP: Please follow up with your primary care physician in one week regarding your hospitalization. Please also follow-up with Dr. Encinas as an outpatient in 2-3 weeks from discharge.

## 2019-02-17 NOTE — DISCHARGE NOTE ADULT - MEDICATION SUMMARY - MEDICATIONS TO TAKE
I will START or STAY ON the medications listed below when I get home from the hospital:    oxyCODONE 5 mg oral tablet  -- 1 tab(s) by mouth every 8 hours MDD:3  -- Caution federal law prohibits the transfer of this drug to any person other  than the person for whom it was prescribed.  It is very important that you take or use this exactly as directed.  Do not skip doses or discontinue unless directed by your doctor.  May cause drowsiness.  Alcohol may intensify this effect.  Use care when operating dangerous machinery.  This prescription cannot be refilled.  Using more of this medication than prescribed may cause serious breathing problems.    -- Indication: For pain - home med    Vicodin 5 mg-300 mg oral tablet  -- 1 tab(s) by mouth every 6 hours MDD:4  -- Caution federal law prohibits the transfer of this drug to any person other  than the person for whom it was prescribed.  Do not drink alcoholic beverages when taking this medication.  May cause drowsiness.  Alcohol may intensify this effect.  Use care when operating dangerous machinery.  This drug may impair the ability to drive or operate machinery.  Use care until you become familiar with its effects.  This product contains acetaminophen.  Do not use  with any other product containing acetaminophen to prevent possible liver damage.  Using more of this medication than prescribed may cause serious breathing problems.    -- Indication: For pain - home med    Zofran ODT 8 mg oral tablet, disintegrating  -- 1 tab(s) by mouth 2 times a day, As Needed - for nausea  -- Indication: For nausea - home med    cephalexin 500 mg oral tablet  -- 1 tab(s) by mouth 2 times a day   -- Finish all this medication unless otherwise directed by prescriber.    -- Indication: For Infection prevention/treatment - home med    Keflex 250 mg oral capsule  -- 1 cap(s) by mouth 2 times a day   -- Finish all this medication unless otherwise directed by prescriber.    -- Indication: For Infection prevention/treatment - home med    docusate sodium 100 mg oral capsule  -- 1 cap(s) by mouth 3 times a day  -- Indication: For Constipation - home med    polyethylene glycol 3350 oral powder for reconstitution  -- 17 gram(s) by mouth 2 times a day  -- Indication: For constipation - home med

## 2019-02-17 NOTE — PROGRESS NOTE ADULT - SUBJECTIVE AND OBJECTIVE BOX
B Team Surgery Progress Note    Interval: NGT removed yesterday. Patient now tolerating CLD. She did inadvertently have approximately half a plate of regular food during dinner last night due to misunderstanding between the patient/mother and the primary team. Had complaints of burning in her bladder while walking with the rendon catheter. No acute overnight events.    SUBJECTIVE: Patient seen and examined at the bedside. Feeling well this morning. Tolerating clear liquids without nausea or vomiting. Pain is well controlled. Has passed flatus, has not passed a bowel movement. Ambulating at baseline function with crutches/assistance.    VITALS  T(C): 37.1 (02-16-19 @ 16:58), Max: 37.1 (02-16-19 @ 16:58)  HR: 98 (02-16-19 @ 22:01) (78 - 98)  BP: 99/59 (02-16-19 @ 22:01) (95/60 - 104/69)  RR: 18 (02-16-19 @ 22:01) (18 - 20)  SpO2: 97% (02-16-19 @ 22:01) (97% - 100%)    Is/Os    02-15 @ 07:01  -  02-16 @ 07:00  --------------------------------------------------------  IN:  Total IN: 0 mL    OUT:    Indwelling Catheter - Urethral: 3150 mL    Nasoenteral Tube: 50 mL  Total OUT: 3200 mL    Total NET: -3200 mL      02-16 @ 07:01  -  02-17 @ 01:21  --------------------------------------------------------  IN:    Oral Fluid: 630 mL  Total IN: 630 mL    OUT:    Indwelling Catheter - Urethral: 3550 mL    Nasoenteral Tube: 100 mL  Total OUT: 3650 mL    Total NET: -3020 mL    PHYSICAL EXAM:   General: NAD, Lying in bed comfortably, alert, oriented x3  Pulm: Non-labored breathing on RA  GI/Abd: Soft, NT/ND, no rebound/guarding     MEDICATIONS (STANDING): enoxaparin Injectable 40 milliGRAM(s) SubCutaneous every 24 hours  influenza   Vaccine 0.5 milliLiter(s) IntraMuscular once    MEDICATIONS (PRN):  LABS  CBC (02-16 @ 06:00)                              10.6<L>                         6.90    )----------------(  209        --    % Neutrophils, --    % Lymphocytes, ANC: --                                  34.0<L>    BMP (02-16 @ 06:00)             139     |  102     |  13    		Ca++ --      Ca 8.4                ---------------------------------( 52<L> 		Mg 2.0                4.5     |  18<L>   |  0.48<L>			Ph 4.5

## 2019-02-17 NOTE — PROGRESS NOTE ADULT - ASSESSMENT
ASSESSMENT   28F with PMH of spina bifida s/p  shunt placement (2 days old) w/ revision (5 years old) and spinal cord detethering admitted for SBO, now resolved and tolerating CLD    PLAN  - Advance to regular diet this AM  - d/c rendon this AM and f/u urinary symptoms  - Monitor GI function and po tolerance  - DVT ppx  - dispo planning for later today    B team surgery  Pager [phone]

## 2019-04-09 NOTE — ED PROVIDER NOTE - MEDICATIONS Q1 - PARENTERAL NARCOTICS ADMINISTERED FOR MANAGEMENT OF PAIN
Yes

## 2019-07-26 NOTE — DISCHARGE NOTE ADULT - CARE PROVIDER_API CALL
----- Message from Davonte Betancourt DO sent at 7/26/2019 10:23 AM CDT -----  Labs reviewed, he appears to have a urinary tract infection, if he has any urinary symptoms whatsoever, he needs Bactrim DS, 1 tab p.o. b.i.d. ×7 days. Also please add a direct bilirubin for hyperbilirubinemia, we'll discuss the remainder of his labs in the office.  
Bilirubin added.  Pt was notified of results.  He states that he has no symptoms but will call if he develops any.   
Reece Encinas)  Surgery  [address], [address]  [place] [address]  Phone: [phone]  Fax: [phone]  Follow Up Time:

## 2019-08-28 NOTE — ED PROVIDER NOTE - CROS ED ROS STATEMENT
Word finding difficulties, resolved  Lack of fluency and repetition resolved  Intact comprehension  Associated with transient right temporal visual field blurring which is no longer present  CT cerebral perfusion analysis showed a T-max of more than 6 seconds likely representing combination of complete infarct and ischemia.  Awaiting neurology opinion after results of MRI scan of the head with and without contrast-which was negative, with no evidence of acute infarction in the brain parenchyma. NIH score on presentation was 2       all other ROS negative except as per HPI

## 2020-01-29 NOTE — ED PROVIDER NOTE - CPE EDP HEME LYMPH NORM
[facility]  [address]  [phone] [address] of Service: nursing home place of service: [facility] Coverage      NAME: Maine Gonzalez  AGE: 76 y o  SEX: female [phone]    DATE OF ENCOUNTER: 1/28/2020    Assessment and Plan     Problem List Items Addressed This Visit        Other    Cellulitis - Primary     Cellulitis of left lateral thigh  Marked the erythematous area with a marker and will monitor  Check area q 4 hours  Check VS q shift  Started on keflex  Chief Complaint     Follow up redness left lateral thigh    History of Present Illness     Maine Gonzalez is a 76 y o  female who was seen today for follow up  Nurse reported that small area of erythema was noted yesterday , today increased in size and warm to touch, patient denies pain( paraplegic) fever, chills  She stated that she had cellulitis of LLE in the past        The following portions of the patient's history were reviewed and updated as appropriate: allergies, current medications, past family history, past medical history, past social history, past surgical history and problem list     Review of Systems     Review of Systems   Constitutional: Negative for appetite change, chills and fever  Respiratory: Negative for shortness of breath  Cardiovascular: Negative for chest pain and leg swelling  Musculoskeletal: Positive for gait problem  Skin: Positive for rash and wound  Neurological: Positive for tremors and weakness (b/l LE)         Active Problem List     Patient Active Problem List   Diagnosis    Neurogenic bladder    Chronic osteomyelitis (HCC)    Depression    DVT (deep venous thrombosis) ([facility])    Heart murmur    HTN (hypertension)    Hyperlipidemia    Hyponatremia    Pressure injury of right buttock, stage 4 ([facility])    Kidney lesion, native, right    Neurogenic bowel    Paralysis ([address] )    Paraplegia following spinal cord injury ([facility]    [facility]'s disease [facility]    Postoperative anemia due to acute blood loss    Pressure injury of deep tissue    Seizure disorder (HCC)    Protein-calorie malnutrition (HCC)    Ambulatory dysfunction    Chronic right shoulder pain    Cellulitis       Objective     Vital Signs:     Blood pressure 134/68 Heart Rate: 76 Respiratory Rate 18   Temperature 98 2 Oxygen Saturation 95 % rA     Physical Exam   Constitutional: She is oriented to person, place, and time  Cardiovascular: Normal rate and regular rhythm  No murmur heard  Pulmonary/Chest: Effort normal and breath sounds normal  No respiratory distress  She has no wheezes  Musculoskeletal: She exhibits no tenderness  Neurological: She is alert and oriented to person, place, and time  A sensory deficit is present  She exhibits abnormal muscle tone  Skin: Skin is warm  There is erythema  Left lateral thigh erythematous and warm to touch   Psychiatric: Her behavior is normal        Pertinent Laboratory/Diagnostic Studies:  Laboratory and Imaging studies reviewed  Full report in the paper chart  Current Medications   Medications reviewed and updated in facility chart      Name: Cathy Sandoval  : 1951  MRN: [id number]  DOS: 2020      Kristyn Mojica MD  Geriatric Medicine  2020 10:06 PM normal...

## 2021-08-13 NOTE — ED PROVIDER NOTE - NS ED MD EM SELECTION
Pt/ CG screened for COVID-19 Negative RN assessment completed. Vitals WNL's Pt. was sitting in w/c in front of the TV in her apartment. Pt's son is here visiting, Pt was alert. Skin, warm, dry and intact. Sacral area intact but remains fragile because she sits alot. Pt. is  still sleeping upstairs on the couch. CG reports pt is showered down in her apartment but otherwise stays upstairs. Pt. is continent of B/B. Wears pull-ups. Last BM 8/10/21. No issues with urination. Appetite/Fluid intake is good. Pt. eats at least 80% per CG. No coughing noted when she drinks liquids. Medications reviewed: Refilled Levothyroxine, Ibuprofen, Gabapentin, Supplies needed: Wipes and Liners and chuxs. DME needed: None. Pt.'s Right arm is better with the Neurontin 300mg TID. Right arm is not as sensitive but she is still trying to use it to  food. MAC score 18 cm. Leela Montes continues to do leg exercises that PT gave her. Reports her mom not doing as well transferring but pt. still leans. CG is aware pt. may not qualify when her next recert is due. We  will just have to wait and see. We will need to try decreasing the HHA to 1 wk soon due to fall I would like to wait. Safety precautions are being followed. Pt. has a seat belt in her W/C. CG has started working to see if she can get VA benefits for her mom but not sure if this will go through.
[id number] Comprehensive

## 2023-03-02 NOTE — PROGRESS NOTE ADULT - ASSESSMENT
You have been given medication that makes you sleepy. This may have included both pain medication and sleeping medication. Most of the effects have worn off but you may still have some drowsiness for the next 6 to 8 hours. Home Care  Follow these guidelines when you get home:    --Don't drink any alcohol for the next 24 hours. --Don't drive, operate dangerous machinery, make important business or personal    decisions, or sign legal documents during the next 24 hours.     Follow Up Care  Follow up with your healthcare provider if you are not alert and back to your usual level of activity within 12 hours    When to seek medical advice  Call your healthcare provider right away if any of these occur:    --Drowsiness that gets worse  --Weakness or dizziness that gets worse  --Repeated vomiting  --You can not be awakened 28F with PMH of spina bifida s/p  shunt placement (2 days old) w/ revision (5 years old) and spinal cord detethering admitted for SBO, NGT in place.    - NG tube  - NPO, IV fluids  - Avoid narcotics  - Gottlieb  - Monitor GI function and NGT outputs    B team surgery  Pager [phone]

## 2023-05-08 NOTE — ED ADULT TRIAGE NOTE - BMI (KG/M2)
Please follow-up with your doctor for recheck in the next couple days.  Take Medrol Dosepak as directed.  You may additionally take Tylenol as needed for pain.  You can also take ibuprofen but use this sparingly while taking the steroid.  Take diazepam as needed only for severe stiffness or spasm, but do not take before working, driving, drinking alcohol, or any other dangerous activity, uses mostly at night to help you get some sleep.  Return if any new or worsening symptoms, such as severe pain, focal weakness or loss of sensation, incontinence of urine or stool, inability to urinate, difficulty walking, or any other concerning symptoms.  
18.6